# Patient Record
(demographics unavailable — no encounter records)

---

## 2025-03-28 NOTE — PHYSICAL EXAM
[Well Developed] : well developed [Well Nourished] : well nourished [No Carotid Bruit] : no carotid bruit [Normal S1, S2] : normal S1, S2 [No Murmur] : no murmur [Clear Lung Fields] : clear lung fields [Good Air Entry] : good air entry [Normal Gait] : normal gait [No Edema] : no edema [Moves all extremities] : moves all extremities [No Focal Deficits] : no focal deficits [Normal Speech] : normal speech [Alert and Oriented] : alert and oriented

## 2025-03-28 NOTE — DISCUSSION/SUMMARY
[FreeTextEntry1] : LE JAQUEZ is a 82 year old F   Dizziness: symptoms have resolved. Recommend pt maintain adequate hydration.   HTN: Elevated BP's today. We discussed the risks of long term uncontrolled HTN. She does not believe she has HTN claims white coat HTN and that home BP log is well controlled.  She self discontinued both Losartan and Metoprolol.  I've requested that she keep a home BP log and call the office in 2 weeks with results of readings (her home BP cuff was calibrated and deemed accurate at PCP office per pt).  HLD: LDL was 154! in August 2024. She continues to decline statins.  Dietary changes to reduce saturated fat intake and other measures to reduce cholesterol have been discussed.   Breast CA s/p chemo and radiation: in remission, ongoing f/u with PCP and Heme/Onc.   Patient was educated on low sodium diet and avoidance of excessive alcohol. Recommended that patient try and follow active lifestyle with regular cardiovascular exercise.   Follow-up as needed.

## 2025-03-28 NOTE — HISTORY OF PRESENT ILLNESS
[FreeTextEntry1] : Blanca is an 82yoF with PMHx of HTN, HLD, Abnormal EKG, breast CA s/p chemo and radiation.  Today she reports that she has been feeling well, no recent illnesses or hospitalizations. Dizziness has resolved. She has not had any recurrence of above symptoms. She is active with walking, maintaining her home, she goes back and forth between  and [place]. She will be establishing care with PCP in [place] in October. She denies chest pain, shortness of breath or unusual dyspnea. She denies palpitations, near syncope or syncope.   We reviewed  Echo: EF 60-65%, LV global longitudinal strain is borderline, trace MR, mild MO, PASP 15mmHg.  Carotid Doppler: Nonobstructive disease b/l.  BP is elevated at 160/90; she is declining blood pressure medications. She believes that she has whitecoat HTN and reports that home BP log is well controlled. It was not provided for my review. She reports average -126mmHg.  She is asymptomatic for HTN.   Hypercholesteremia.  Declines statins.